# Patient Record
Sex: FEMALE | Race: WHITE
[De-identification: names, ages, dates, MRNs, and addresses within clinical notes are randomized per-mention and may not be internally consistent; named-entity substitution may affect disease eponyms.]

---

## 2017-01-07 ENCOUNTER — HOSPITAL ENCOUNTER (EMERGENCY)
Dept: HOSPITAL 13 - EME | Age: 37
LOS: 1 days | Discharge: HOME | End: 2017-01-08
Payer: COMMERCIAL

## 2017-01-07 VITALS — HEIGHT: 65 IN | WEIGHT: 293 LBS | BODY MASS INDEX: 48.82 KG/M2

## 2017-01-07 DIAGNOSIS — M94.0: ICD-10-CM

## 2017-01-07 DIAGNOSIS — R17: ICD-10-CM

## 2017-01-07 DIAGNOSIS — F41.1: Primary | ICD-10-CM

## 2017-01-07 DIAGNOSIS — Z87.891: ICD-10-CM

## 2017-01-07 DIAGNOSIS — J45.909: ICD-10-CM

## 2017-01-07 DIAGNOSIS — K21.9: ICD-10-CM

## 2017-01-07 LAB
ALP SERPL-CCNC: 66 IU/L (ref 3–129)
ANION GAP: 6 MEQ/L (ref 2–14)
BILIRUB DIRECT SERPL-MCNC: 0.5 MG/DL (ref 0–0.3)
CARBAMAZEPINE SERPL-MCNC: < 0.01 NG/ML (ref 0–0.3)
CHLORIDE: 107 MEQ/L (ref 99–109)
ETHANOL SERPL-MCNC: NEGATIVE MG/DL
GFR SERPLBLD CREATININE-BSD FMLAMALE: > 59 ML/MIN/
GLUCOSE METER DEVICE PNL: 0.44 MG/L FEU (ref ?–0.57)
GLUCOSE SERPL-MCNC: 102 MG/DL (ref 70–99)
HCO3 BLD-SCNC: 26 MEQ/L (ref 20–31)
HCT VFR BLD CALC: 40.6 % (ref 36–46)
LIPASE SERPL-CCNC: 11 U/L (ref 1–51)
MCH RBC QN AUTO: 32.2 PG (ref 29–34)
MCV: 87.7 FL (ref 83–99)
MONOCYTES # BLD MANUAL: 36.7 G/DL (ref 30–36)
PLATELET COUNT: 287 K/UL (ref 156–360)
POTASSIUM SERPL-SCNC: 3.9 MEQ/L (ref 3.7–5.4)
RBC # BLD AUTO: 4.63 M/UL (ref 3.8–5.2)
RBC DIS.WIDTH-CV: 12 % (ref 11.8–14.6)
RBC DIS.WIDTH-SD: 38.2 % (ref 39–53)
SODIUM: 139 MEQ/L (ref 136–147)
TOTAL BILIRUBIN: 1.7 MG/DL (ref 0–1)
UREA NITROGEN (BUN): 16 MG/DL (ref 9–23)
VARIANT LYMPHS NFR BLD MANUAL: 9.7 UM^3 (ref 9.5–12.4)
WBC # BLD AUTO: 10 K/UL (ref 4.1–10.2)

## 2017-01-07 PROCEDURE — S0028 INJECTION, FAMOTIDINE, 20 MG: HCPCS

## 2017-01-08 VITALS — DIASTOLIC BLOOD PRESSURE: 74 MMHG | SYSTOLIC BLOOD PRESSURE: 110 MMHG

## 2017-01-08 LAB
CARBAMAZEPINE SERPL-MCNC: < 0.01 NG/ML (ref 0–0.3)
ETHANOL SERPL-MCNC: NEGATIVE MG/DL

## 2017-11-03 ENCOUNTER — APPOINTMENT (OUTPATIENT)
Age: 37
Setting detail: DERMATOLOGY
End: 2017-11-03

## 2017-11-03 DIAGNOSIS — D18.0 HEMANGIOMA: ICD-10-CM

## 2017-11-03 PROBLEM — D48.5 NEOPLASM OF UNCERTAIN BEHAVIOR OF SKIN: Status: ACTIVE | Noted: 2017-11-03

## 2017-11-03 PROCEDURE — OTHER SHAVE REMOVAL: OTHER

## 2017-11-03 PROCEDURE — 11311 SHAVE SKIN LESION 0.6-1.0 CM: CPT

## 2017-11-03 ASSESSMENT — LOCATION DETAILED DESCRIPTION DERM: LOCATION DETAILED: RIGHT FOREHEAD

## 2017-11-03 ASSESSMENT — LOCATION SIMPLE DESCRIPTION DERM: LOCATION SIMPLE: RIGHT FOREHEAD

## 2017-11-03 ASSESSMENT — LOCATION ZONE DERM: LOCATION ZONE: FACE

## 2017-11-03 NOTE — PROCEDURE: SHAVE REMOVAL
Anesthesia Type: 2% lidocaine with epinephrine and a 1:10 solution of 8.4% sodium bicarbonate
Anesthesia Volume In Cc: -
Bill 79558 For Specimen Handling/Conveyance To Laboratory?: no
Size Of Lesion In Cm (Required): 0.8
Biopsy Method: Dermablade
Wound Care: Bacitracin
Consent was obtained from the patient. The risks and benefits to therapy were discussed in detail. Specifically, the risks of infection, scarring, bleeding, prolonged wound healing, incomplete removal, allergy to anesthesia, nerve injury and recurrence were addressed. Prior to the procedure, the treatment site was clearly identified and confirmed by the patient. All components of Universal Protocol/PAUSE Rule completed.
Medical Necessity Information: It is in your best interest to select a reason for this procedure from the list below. All of these items fulfill various CMS LCD requirements except the new and changing color options.
Billing Type: Third-Party Bill
Detail Level: Detailed
Post-Care Instructions: I reviewed with the patient in detail post-care instructions. Patient is to keep the biopsy site dry overnight, and then apply bacitracin twice daily until healed. Patient may apply hydrogen peroxide soaks to remove any crusting.
Hemostasis: Drysol
X Size Of Lesion In Cm (Optional): 0
Medical Necessity Clause: This procedure was medically necessary because the lesion that was treated was:
Notification Instructions: Patient will be notified of biopsy results. However, patient instructed to call the office if not contacted within 2 weeks.
Path Notes (To The Dermatopathologist): Please check margins.

## 2018-02-14 ENCOUNTER — HOSPITAL ENCOUNTER (INPATIENT)
Dept: HOSPITAL 13 - LDRP-OP | Age: 38
LOS: 2 days | Discharge: HOME | End: 2018-02-16
Payer: COMMERCIAL

## 2018-02-14 VITALS — DIASTOLIC BLOOD PRESSURE: 67 MMHG | SYSTOLIC BLOOD PRESSURE: 112 MMHG

## 2018-02-14 VITALS — SYSTOLIC BLOOD PRESSURE: 104 MMHG | DIASTOLIC BLOOD PRESSURE: 62 MMHG

## 2018-02-14 VITALS — SYSTOLIC BLOOD PRESSURE: 113 MMHG | DIASTOLIC BLOOD PRESSURE: 64 MMHG

## 2018-02-14 VITALS — DIASTOLIC BLOOD PRESSURE: 58 MMHG | SYSTOLIC BLOOD PRESSURE: 111 MMHG

## 2018-02-14 VITALS — SYSTOLIC BLOOD PRESSURE: 117 MMHG | DIASTOLIC BLOOD PRESSURE: 62 MMHG

## 2018-02-14 VITALS — DIASTOLIC BLOOD PRESSURE: 58 MMHG | SYSTOLIC BLOOD PRESSURE: 116 MMHG

## 2018-02-14 VITALS — SYSTOLIC BLOOD PRESSURE: 113 MMHG | DIASTOLIC BLOOD PRESSURE: 63 MMHG

## 2018-02-14 VITALS — DIASTOLIC BLOOD PRESSURE: 60 MMHG | SYSTOLIC BLOOD PRESSURE: 122 MMHG

## 2018-02-14 VITALS — SYSTOLIC BLOOD PRESSURE: 111 MMHG | DIASTOLIC BLOOD PRESSURE: 71 MMHG

## 2018-02-14 VITALS — SYSTOLIC BLOOD PRESSURE: 124 MMHG | DIASTOLIC BLOOD PRESSURE: 72 MMHG

## 2018-02-14 VITALS — DIASTOLIC BLOOD PRESSURE: 64 MMHG | SYSTOLIC BLOOD PRESSURE: 122 MMHG

## 2018-02-14 VITALS — DIASTOLIC BLOOD PRESSURE: 63 MMHG | SYSTOLIC BLOOD PRESSURE: 122 MMHG

## 2018-02-14 VITALS — SYSTOLIC BLOOD PRESSURE: 135 MMHG | DIASTOLIC BLOOD PRESSURE: 65 MMHG

## 2018-02-14 VITALS — DIASTOLIC BLOOD PRESSURE: 61 MMHG | SYSTOLIC BLOOD PRESSURE: 111 MMHG

## 2018-02-14 VITALS — DIASTOLIC BLOOD PRESSURE: 57 MMHG | SYSTOLIC BLOOD PRESSURE: 100 MMHG

## 2018-02-14 VITALS — DIASTOLIC BLOOD PRESSURE: 58 MMHG | SYSTOLIC BLOOD PRESSURE: 104 MMHG

## 2018-02-14 VITALS — BODY MASS INDEX: 42.63 KG/M2 | WEIGHT: 271.61 LBS | HEIGHT: 67 IN

## 2018-02-14 VITALS — DIASTOLIC BLOOD PRESSURE: 70 MMHG | SYSTOLIC BLOOD PRESSURE: 111 MMHG

## 2018-02-14 DIAGNOSIS — K21.9: ICD-10-CM

## 2018-02-14 DIAGNOSIS — Z87.891: ICD-10-CM

## 2018-02-14 DIAGNOSIS — F32.9: ICD-10-CM

## 2018-02-14 DIAGNOSIS — K44.9: ICD-10-CM

## 2018-02-14 DIAGNOSIS — E66.01: ICD-10-CM

## 2018-02-14 DIAGNOSIS — A60.00: ICD-10-CM

## 2018-02-14 DIAGNOSIS — Z3A.40: ICD-10-CM

## 2018-02-14 LAB
BASOPHIL (%): 0.2 % (ref 0–1)
BASOPHILS # BLD AUTO: 13.1 G/DL (ref 11.9–15.5)
EOSINOPHIL # BLD: 0.4 K/UL (ref 0–0.3)
EOSINOPHIL (%): 2.5 % (ref 0–5)
HCT VFR BLD CALC: 37 % (ref 36–46)
IMMATURE GRANULOCYTE (%): 0.8 % (ref 0–0.7)
LYMPHOCYTE COUNT: 2.2 K/UL (ref 1–2.8)
MCH RBC QN AUTO: 32.9 PG (ref 29–34)
MCV: 93 FL (ref 83–99)
MONOCYTE (%): 4.9 % (ref 3–12)
MONOCYTES # BLD MANUAL: 35.4 G/DL (ref 30–36)
MONOCYTES # BLD: 0.7 K/UL (ref 0–0.8)
NEUTROPHIL (%): 75.6 % (ref 45–76)
NEUTROPHIL COUNT: 10.5 K/UL (ref 1.8–6.4)
NRBC (%): 0 /100 WBC (ref 0–0)
OCT SER-CCNC: 16 % (ref 15–42)
PAPPENHEIMER BOD BLD QL SMEAR: 0 K/UL (ref 0–0.1)
PLATELET COUNT: 244 K/UL (ref 156–360)
RBC # BLD AUTO: 3.98 M/UL (ref 3.8–5.2)
RBC DIS.WIDTH-CV: 13.6 % (ref 11.8–14.6)
RBC DIS.WIDTH-SD: 46 % (ref 39–53)
WBC # BLD AUTO: 13.8 K/UL (ref 4.1–10.2)

## 2018-02-14 PROCEDURE — 3E0P7VZ INTRODUCTION OF HORMONE INTO FEMALE REPRODUCTIVE, VIA NATURAL OR ARTIFICIAL OPENING: ICD-10-PCS

## 2018-02-14 PROCEDURE — C1755 CATHETER, INTRASPINAL: HCPCS

## 2018-02-14 PROCEDURE — 3E0R3BZ INTRODUCTION OF ANESTHETIC AGENT INTO SPINAL CANAL, PERCUTANEOUS APPROACH: ICD-10-PCS

## 2018-02-14 PROCEDURE — 10907ZC DRAINAGE OF AMNIOTIC FLUID, THERAPEUTIC FROM PRODUCTS OF CONCEPTION, VIA NATURAL OR ARTIFICIAL OPENING: ICD-10-PCS | Performed by: ADVANCED PRACTICE MIDWIFE

## 2018-02-14 PROCEDURE — 3E033VJ INTRODUCTION OF OTHER HORMONE INTO PERIPHERAL VEIN, PERCUTANEOUS APPROACH: ICD-10-PCS

## 2018-02-14 PROCEDURE — G0378 HOSPITAL OBSERVATION PER HR: HCPCS

## 2018-02-14 PROCEDURE — 00HU33Z INSERTION OF INFUSION DEVICE INTO SPINAL CANAL, PERCUTANEOUS APPROACH: ICD-10-PCS

## 2018-02-15 VITALS — DIASTOLIC BLOOD PRESSURE: 66 MMHG | SYSTOLIC BLOOD PRESSURE: 122 MMHG

## 2018-02-15 VITALS — DIASTOLIC BLOOD PRESSURE: 54 MMHG | SYSTOLIC BLOOD PRESSURE: 98 MMHG

## 2018-02-15 VITALS — SYSTOLIC BLOOD PRESSURE: 89 MMHG | DIASTOLIC BLOOD PRESSURE: 53 MMHG

## 2018-02-15 VITALS — SYSTOLIC BLOOD PRESSURE: 122 MMHG | DIASTOLIC BLOOD PRESSURE: 66 MMHG

## 2018-02-15 VITALS — SYSTOLIC BLOOD PRESSURE: 126 MMHG | DIASTOLIC BLOOD PRESSURE: 67 MMHG

## 2018-02-15 VITALS — DIASTOLIC BLOOD PRESSURE: 51 MMHG | SYSTOLIC BLOOD PRESSURE: 88 MMHG

## 2018-02-15 VITALS — SYSTOLIC BLOOD PRESSURE: 100 MMHG | DIASTOLIC BLOOD PRESSURE: 52 MMHG

## 2018-02-15 VITALS — SYSTOLIC BLOOD PRESSURE: 114 MMHG | DIASTOLIC BLOOD PRESSURE: 61 MMHG

## 2018-02-15 VITALS — SYSTOLIC BLOOD PRESSURE: 104 MMHG | DIASTOLIC BLOOD PRESSURE: 51 MMHG

## 2018-02-15 VITALS — DIASTOLIC BLOOD PRESSURE: 60 MMHG | SYSTOLIC BLOOD PRESSURE: 116 MMHG

## 2018-02-15 VITALS — SYSTOLIC BLOOD PRESSURE: 108 MMHG | DIASTOLIC BLOOD PRESSURE: 59 MMHG

## 2018-02-15 VITALS — DIASTOLIC BLOOD PRESSURE: 57 MMHG | SYSTOLIC BLOOD PRESSURE: 105 MMHG

## 2018-02-15 VITALS — SYSTOLIC BLOOD PRESSURE: 116 MMHG | DIASTOLIC BLOOD PRESSURE: 55 MMHG

## 2018-02-15 VITALS — DIASTOLIC BLOOD PRESSURE: 57 MMHG | SYSTOLIC BLOOD PRESSURE: 114 MMHG

## 2018-02-15 VITALS — DIASTOLIC BLOOD PRESSURE: 86 MMHG | SYSTOLIC BLOOD PRESSURE: 112 MMHG

## 2018-02-15 VITALS — SYSTOLIC BLOOD PRESSURE: 117 MMHG | DIASTOLIC BLOOD PRESSURE: 56 MMHG

## 2018-02-15 VITALS — DIASTOLIC BLOOD PRESSURE: 52 MMHG | SYSTOLIC BLOOD PRESSURE: 111 MMHG

## 2018-02-15 VITALS — SYSTOLIC BLOOD PRESSURE: 112 MMHG | DIASTOLIC BLOOD PRESSURE: 68 MMHG

## 2018-02-15 VITALS — SYSTOLIC BLOOD PRESSURE: 121 MMHG | DIASTOLIC BLOOD PRESSURE: 64 MMHG

## 2018-02-15 VITALS — DIASTOLIC BLOOD PRESSURE: 53 MMHG | SYSTOLIC BLOOD PRESSURE: 93 MMHG

## 2018-02-15 PROCEDURE — 0HQ9XZZ REPAIR PERINEUM SKIN, EXTERNAL APPROACH: ICD-10-PCS

## 2018-02-16 VITALS — DIASTOLIC BLOOD PRESSURE: 75 MMHG | SYSTOLIC BLOOD PRESSURE: 122 MMHG

## 2018-02-16 VITALS — SYSTOLIC BLOOD PRESSURE: 130 MMHG | DIASTOLIC BLOOD PRESSURE: 72 MMHG

## 2018-02-16 LAB
BASOPHIL (%): 0.6 % (ref 0–1)
BASOPHILS # BLD AUTO: 11.5 G/DL (ref 11.9–15.5)
EOSINOPHIL # BLD: 0.5 K/UL (ref 0–0.3)
EOSINOPHIL (%): 4.9 % (ref 0–5)
HCT VFR BLD CALC: 34.2 % (ref 36–46)
IMMATURE GRANULOCYTE (%): 1.1 % (ref 0–0.7)
LYMPHOCYTE COUNT: 3 K/UL (ref 1–2.8)
MCH RBC QN AUTO: 32.5 PG (ref 29–34)
MCV: 96.6 FL (ref 83–99)
MONOCYTE (%): 5.2 % (ref 3–12)
MONOCYTES # BLD MANUAL: 33.6 G/DL (ref 30–36)
MONOCYTES # BLD: 0.5 K/UL (ref 0–0.8)
NEUTROPHIL (%): 58 % (ref 45–76)
NEUTROPHIL COUNT: 5.8 K/UL (ref 1.8–6.4)
NRBC (%): 0 /100 WBC (ref 0–0)
OCT SER-CCNC: 30.2 % (ref 15–42)
PAPPENHEIMER BOD BLD QL SMEAR: 0.1 K/UL (ref 0–0.1)
PLATELET COUNT: 221 K/UL (ref 156–360)
RBC # BLD AUTO: 3.54 M/UL (ref 3.8–5.2)
RBC DIS.WIDTH-CV: 14 % (ref 11.8–14.6)
RBC DIS.WIDTH-SD: 49.5 % (ref 39–53)
WBC # BLD AUTO: 10 K/UL (ref 4.1–10.2)

## 2018-03-26 ENCOUNTER — HOSPITAL ENCOUNTER (EMERGENCY)
Dept: HOSPITAL 13 - EME | Age: 38
Discharge: HOME | End: 2018-03-26
Payer: COMMERCIAL

## 2018-03-26 VITALS — WEIGHT: 247.36 LBS | BODY MASS INDEX: 39.75 KG/M2 | HEIGHT: 66 IN

## 2018-03-26 VITALS — SYSTOLIC BLOOD PRESSURE: 108 MMHG | DIASTOLIC BLOOD PRESSURE: 71 MMHG

## 2018-03-26 DIAGNOSIS — Z87.891: ICD-10-CM

## 2018-03-26 DIAGNOSIS — Z90.49: ICD-10-CM

## 2018-03-26 DIAGNOSIS — R07.9: ICD-10-CM

## 2018-03-26 DIAGNOSIS — M54.9: ICD-10-CM

## 2018-03-26 DIAGNOSIS — R10.13: ICD-10-CM

## 2018-03-26 DIAGNOSIS — Z87.19: ICD-10-CM

## 2018-03-26 LAB
ALBUMIN SERPL-MCNC: 4 G/DL (ref 3.2–4.8)
ALP SERPL-CCNC: 99 IU/L (ref 3–129)
ALT (GPT): 12 IU/L (ref 3–49)
AST (GOT): 18 IU/L (ref 2–34)
BASOPHILS # BLD AUTO: 14.5 G/DL (ref 11.9–15.5)
BILIRUB DIRECT SERPL-MCNC: 0.8 MG/DL (ref 0–0.3)
BILIRUBIN: NEGATIVE
BLOOD: NEGATIVE
CARBAMAZEPINE SERPL-MCNC: 0.01 NG/ML (ref 0–0.3)
CARBAMAZEPINE SERPL-MCNC: < 0.01 NG/ML (ref 0–0.3)
CHLORIDE: 108 MEQ/L (ref 99–109)
COLOR UR: YELLOW
CREATININE: 0.7 MG/DL (ref 0.6–1.3)
D DIMER PPP FEU-MCNC: NEGATIVE
ETHANOL SERPL-MCNC: NEGATIVE MG/DL
ETHANOL SERPL-MCNC: NEGATIVE MG/DL
GFR SERPLBLD CREATININE-BSD FMLAMALE: > 59 ML/MIN/
GLUCOSE (STRIP): NEGATIVE
GLUCOSE SERPL-MCNC: 103 MG/DL (ref 70–99)
HCO3 BLD-SCNC: 22 MEQ/L (ref 20–31)
HCT VFR BLD CALC: 40.2 % (ref 36–46)
IRON SERPL-MCNC: NEGATIVE UG/DL
LDLC SERPL DIRECT ASSAY-MCNC: CLEAR MG/DL
LIPASE SERPL-CCNC: 20 U/L (ref 1–51)
MCH RBC QN AUTO: 33 PG (ref 29–34)
MCV: 91.4 FL (ref 83–99)
MONOCYTES # BLD MANUAL: 36.1 G/DL (ref 30–36)
NITRITE: NEGATIVE
NRBC (%): 0 /100 WBC (ref 0–0)
PLATELET COUNT: 300 K/UL (ref 156–360)
POTASSIUM SERPL-SCNC: 3.9 MEQ/L (ref 3.7–5.4)
QUANTITATIVE HCG: < 4 MIU/ML
RBC # BLD AUTO: 4.4 M/UL (ref 3.8–5.2)
RBC DIS.WIDTH-CV: 11.6 % (ref 11.8–14.6)
RBC DIS.WIDTH-SD: 39.1 % (ref 39–53)
SODIUM: 140 MEQ/L (ref 136–147)
SPECIFIC GRAVITY: 1.03 (ref 1–1.03)
TOTAL BILIRUBIN: 2.5 MG/DL (ref 0–1)
TOTAL PROTEIN: 6.5 G/DL (ref 6.4–8.3)
UCUL ADDED?: NO
UREA NITROGEN (BUN): 16 MG/DL (ref 9–23)
URINE COMMENT 1: (no result)
UROBILINOGEN UR-MCNC: 0.2 MG/DL (ref 0.2–1)
WBC # BLD AUTO: 8.8 K/UL (ref 4.1–10.2)
WBC # BLD AUTO: NEGATIVE 10*3/UL

## 2018-07-11 ENCOUNTER — HOSPITAL ENCOUNTER (OUTPATIENT)
Dept: HOSPITAL 13 - SDC | Age: 38
Discharge: HOME | End: 2018-07-11
Attending: OBSTETRICS & GYNECOLOGY
Payer: COMMERCIAL

## 2018-07-11 VITALS — DIASTOLIC BLOOD PRESSURE: 59 MMHG | SYSTOLIC BLOOD PRESSURE: 117 MMHG

## 2018-07-11 VITALS — WEIGHT: 239.2 LBS | BODY MASS INDEX: 36.25 KG/M2 | HEIGHT: 68 IN

## 2018-07-11 VITALS — SYSTOLIC BLOOD PRESSURE: 113 MMHG | DIASTOLIC BLOOD PRESSURE: 66 MMHG

## 2018-07-11 VITALS — SYSTOLIC BLOOD PRESSURE: 117 MMHG | DIASTOLIC BLOOD PRESSURE: 59 MMHG

## 2018-07-11 DIAGNOSIS — K66.0: ICD-10-CM

## 2018-07-11 DIAGNOSIS — Z90.79: ICD-10-CM

## 2018-07-11 DIAGNOSIS — K44.9: ICD-10-CM

## 2018-07-11 DIAGNOSIS — J45.909: ICD-10-CM

## 2018-07-11 DIAGNOSIS — Z30.2: Primary | ICD-10-CM

## 2018-07-11 DIAGNOSIS — K21.9: ICD-10-CM

## 2018-07-11 DIAGNOSIS — Z87.891: ICD-10-CM

## 2018-07-11 DIAGNOSIS — E66.01: ICD-10-CM

## 2018-07-11 DIAGNOSIS — R00.1: ICD-10-CM

## 2018-07-11 PROCEDURE — 0DNW4ZZ RELEASE PERITONEUM, PERCUTANEOUS ENDOSCOPIC APPROACH: ICD-10-PCS | Performed by: SURGERY

## 2018-07-11 PROCEDURE — 0U564ZZ DESTRUCTION OF LEFT FALLOPIAN TUBE, PERCUTANEOUS ENDOSCOPIC APPROACH: ICD-10-PCS | Performed by: OBSTETRICS & GYNECOLOGY

## 2018-07-11 PROCEDURE — S0020 INJECTION, BUPIVICAINE HYDRO: HCPCS
